# Patient Record
(demographics unavailable — no encounter records)

---

## 2019-04-28 NOTE — PHYS DOC
Past Medical History


Past Medical History:  No Pertinent History


Past Surgical History:  No Surgical History


Alcohol Use:  Occasionally


Drug Use:  Marijuana, Other


Social History Narrative:  "[employer]"





Adult General


Chief Complaint


Chief Complaint:  PSYCH EVALUATION





HPI


HPI


Patient is a 21  year old  male who presents with increased anxiety state. 

Patient reports skin his skin situation stress related to working night shift, 

recent breakup of girlfriend and financial stress. Patient reports feeling 

preoccupied and worry about various responsibilities. Reports occasional 

marijuana use. Denies HI,SI, hallucinations and delusions.[]





Review of Systems


Review of Systems


ROS as per HPI





All other systems were reviewed and found to be within normal limits, except as 

documented in this note.





Allergies


Allergies





Allergies








Coded Allergies Type Severity Reaction Last Updated Verified


 


  No Known Drug Allergies    4/27/19 No











Physical Exam


Physical Exam





Constitutional: Well developed, well nourished, no acute distress, non-toxic 

appearance. []


HENT: Normocephalic, atraumatic, bilateral external ears normal, oropharynx 

moist, no oral exudates, nose normal. []


Eyes: PERRLA, EOMI, conjunctiva normal, no discharge. [] 


Neck: Normal range of motion, no tenderness, supple, no stridor. [] 


Lungs & Thorax:  Bilateral breath sounds clear to auscultation []


Abdomen: Bowel sounds normal, soft, no tenderness, no masses, no pulsatile 

masses. [] [] 


Neurologic: Alert and oriented X 3, normal motor function, normal sensory 

function, no focal deficits noted. []


Psychologic: Affect normal, judgement normal, mood normal. []





Current Patient Data


Vital Signs





                                   Vital Signs








  Date Time  Temp Pulse Resp B/P (MAP) Pulse Ox O2 Delivery O2 Flow Rate FiO2


 


4/27/19 22:56 97.6 61 20 126/61 (82) 100 Room Air  





 97.6       








Lab Values





                                Laboratory Tests








Test


 4/27/19


23:15 4/27/19


23:25


 


Urine Opiates Screen Neg (NEG)   


 


Urine Methadone Screen Neg (NEG)   


 


Urine Barbiturates Neg (NEG)   


 


Urine Phencyclidine Screen Neg (NEG)   


 


Urine


Amphetamine/Methamphetamine Neg (NEG)  


 





 


Urine Benzodiazepines Screen Neg (NEG)   


 


Urine Cocaine Screen Neg (NEG)   


 


Urine Cannabinoids Screen Neg (NEG)   


 


Urine Ethyl Alcohol Neg (NEG)   


 


White Blood Count


 


 6.5 x10^3/uL


(4.0-11.0)


 


Red Blood Count


 


 4.89 x10^6/uL


(4.30-5.70)


 


Hemoglobin


 


 14.6 g/dL


(13.0-17.5)


 


Hematocrit


 


 43.1 %


(39.0-53.0)


 


Mean Corpuscular Volume


 


 88 fL ()





 


Mean Corpuscular Hemoglobin  30 pg (25-35)  


 


Mean Corpuscular Hemoglobin


Concent 


 34 g/dL


(31-37)


 


Red Cell Distribution Width


 


 13.1 %


(11.5-14.5)


 


Platelet Count


 


 209 x10^3/uL


(140-400)


 


Neutrophils (%) (Auto)  63 % (31-73)  


 


Lymphocytes (%) (Auto)  28 % (24-48)  


 


Monocytes (%) (Auto)  8 % (0-9)  


 


Eosinophils (%) (Auto)  1 % (0-3)  


 


Basophils (%) (Auto)  1 % (0-3)  


 


Neutrophils # (Auto)


 


 4.1 x10^3uL


(1.8-7.7)


 


Lymphocytes # (Auto)


 


 1.8 x10^3/uL


(1.0-4.8)


 


Monocytes # (Auto)


 


 0.5 x10^3/uL


(0.0-1.1)


 


Eosinophils # (Auto)


 


 0.1 x10^3/uL


(0.0-0.7)


 


Basophils # (Auto)


 


 0.0 x10^3/uL


(0.0-0.2)


 


Sodium Level


 


 139 mmol/L


(136-145)


 


Potassium Level


 


 4.0 mmol/L


(3.5-5.1)


 


Chloride Level


 


 101 mmol/L


()


 


Carbon Dioxide Level


 


 25 mmol/L


(21-32)


 


Anion Gap  13 (6-14)  


 


Blood Urea Nitrogen


 


 14 mg/dL


(8-26)


 


Creatinine


 


 0.8 mg/dL


(0.7-1.3)


 


Estimated GFR


(Cockcroft-Gault) 


 122.0  





 


BUN/Creatinine Ratio  18 (6-20)  


 


Glucose Level


 


 89 mg/dL


(70-99)


 


Calcium Level


 


 9.1 mg/dL


(8.5-10.1)


 


Total Bilirubin


 


 0.5 mg/dL


(0.2-1.0)


 


Aspartate Amino Transferase


(AST) 


 24 U/L (15-37)





 


Alanine Aminotransferase (ALT)


 


 21 U/L (16-63)





 


Alkaline Phosphatase


 


 69 U/L


()


 


Total Protein


 


 8.2 g/dL


(6.4-8.2)


 


Albumin


 


 4.3 g/dL


(3.4-5.0)


 


Albumin/Globulin Ratio  1.1 (1.0-1.7)  


 


Ethyl Alcohol Level


 


 < 10 mg/dL


(0-10)





                                Laboratory Tests


4/27/19 23:25








                                Laboratory Tests


4/27/19 23:25














EKG


EKG


[]





Radiology/Procedures


Radiology/Procedures


[]





Course & Med Decision Making


Course & Med Decision Making


Pertinent Labs and Imaging studies reviewed. (See chart for details)





[PAT counsellor consulted. Recommendations are for outpatient resources. All 

questions answered PTD.]





Dragon Disclaimer


Dragon Disclaimer


This electronic medical record was generated, in whole or in part, using a voice

recognition dictation system.





Departure


Departure


Impression:  


   Primary Impression:  


   Anxiety state, unspecified


Disposition:  01 HOME, SELF-CARE


Condition:  GOOD


Referrals:  


DUSTIN QUINTEROS MD (PCP)


Patient Instructions:  Anxiety and Panic Attacks, Easy-to-Read





Additional Instructions:  


Please follow-up with outpatient resources provided to you in the emergency 

department and local PCP.











IKE TAN DO                   Apr 28, 2019 01:14